# Patient Record
Sex: FEMALE | Race: WHITE | Employment: OTHER | URBAN - METROPOLITAN AREA
[De-identification: names, ages, dates, MRNs, and addresses within clinical notes are randomized per-mention and may not be internally consistent; named-entity substitution may affect disease eponyms.]

---

## 2020-12-20 ENCOUNTER — HOSPITAL ENCOUNTER (EMERGENCY)
Age: 65
Discharge: HOME | End: 2020-12-20
Payer: COMMERCIAL

## 2020-12-20 VITALS
TEMPERATURE: 98.5 F | BODY MASS INDEX: 21.51 KG/M2 | DIASTOLIC BLOOD PRESSURE: 89 MMHG | HEIGHT: 64 IN | WEIGHT: 126 LBS | RESPIRATION RATE: 20 BRPM | HEART RATE: 91 BPM | OXYGEN SATURATION: 97 % | SYSTOLIC BLOOD PRESSURE: 145 MMHG

## 2020-12-20 PROCEDURE — 2500000003 HC RX 250 WO HCPCS: Performed by: PHYSICIAN ASSISTANT

## 2020-12-20 PROCEDURE — 12001 RPR S/N/AX/GEN/TRNK 2.5CM/<: CPT

## 2020-12-20 PROCEDURE — 99283 EMERGENCY DEPT VISIT LOW MDM: CPT

## 2020-12-20 RX ORDER — LIDOCAINE HYDROCHLORIDE 10 MG/ML
5 INJECTION, SOLUTION INFILTRATION; PERINEURAL ONCE
Status: COMPLETED | OUTPATIENT
Start: 2020-12-20 | End: 2020-12-20

## 2020-12-20 RX ADMIN — LIDOCAINE HYDROCHLORIDE 5 ML: 10 INJECTION, SOLUTION INFILTRATION; PERINEURAL at 17:31

## 2020-12-20 ASSESSMENT — ENCOUNTER SYMPTOMS
SHORTNESS OF BREATH: 0
BACK PAIN: 0
ABDOMINAL PAIN: 0

## 2020-12-20 ASSESSMENT — PAIN DESCRIPTION - ORIENTATION: ORIENTATION: LEFT

## 2020-12-20 ASSESSMENT — PAIN SCALES - GENERAL
PAINLEVEL_OUTOF10: 5
PAINLEVEL_OUTOF10: 5

## 2020-12-20 ASSESSMENT — PAIN DESCRIPTION - LOCATION: LOCATION: HAND

## 2020-12-20 ASSESSMENT — PAIN DESCRIPTION - PAIN TYPE: TYPE: ACUTE PAIN

## 2020-12-20 NOTE — ED TRIAGE NOTES
Pt arrived to ED with laceration to top of left hand after cutting it on top of nail. Bleeding controlled at this time.

## 2020-12-20 NOTE — ED PROVIDER NOTES
[address] ENCOUNTER          PHYSICIAN ASSISTANT NOTE       Date of evaluation: 12/20/2020    Chief Complaint     Laceration (cut top of left hand on head of nail)      History of Present Illness     Rosetta Pérez is a 63 y.o. female who presents to the emergency department due to laceration. Patient states that just prior to arrival to the emergency department she accidentally struck the top of her left hand against a nail that was on her ground. She states they had recently remove carpet and the nail was still on the floor. She denies any fall or injury causing the laceration. Denies any current pain at this time. Denies numbness, tingling, or weakness. She has full range of motion of all digits of the hand without difficulty. She states her tetanus shot is up-to-date. Denies any other injuries at this time. Review of Systems     Review of Systems   Constitutional: Negative for activity change. HENT: Negative for congestion. Eyes: Negative for visual disturbance. Respiratory: Negative for shortness of breath. Cardiovascular: Negative for chest pain. Gastrointestinal: Negative for abdominal pain. Musculoskeletal: Negative for back pain. Skin: Positive for wound. Neurological: Negative for light-headedness. Psychiatric/Behavioral: Negative for agitation. Past Medical, Surgical, Family, and Social History     She has no past medical history on file. She has no past surgical history on file. Her family history is not on file. She reports that she has been smoking. She has been smoking about 1.00 pack per day. She has never used smokeless tobacco. She reports current alcohol use. She reports that she does not use drugs. Medications     Previous Medications    ALENDRONATE SODIUM (FOSAMAX PO)    Take by mouth       Allergies     She has No Known Allergies.     Physical Exam INITIAL VITALS: BP: (!) 145/76, Temp: 98.5 °F (36.9 °C), Pulse: 98, Resp: 16, SpO2: 98 %  Physical Exam  Constitutional:       Appearance: Normal appearance. HENT:      Head: Normocephalic and atraumatic. Eyes:      Extraocular Movements: Extraocular movements intact. Pupils: Pupils are equal, round, and reactive to light. Neck:      Musculoskeletal: Normal range of motion. Cardiovascular:      Rate and Rhythm: Normal rate and regular rhythm. Pulmonary:      Effort: Pulmonary effort is normal.   Abdominal:      General: There is no distension. Musculoskeletal: Normal range of motion. Skin:     General: Skin is warm. Comments: 2cm laceration to dorsum of hand over 5th metacarpal bone   Neurological:      General: No focal deficit present. Mental Status: She is alert and oriented to person, place, and time. Sensory: No sensory deficit. Diagnostic Results     RADIOLOGY:  No orders to display       LABS:   No results found for this visit on 12/20/20. RECENT VITALS:  BP: (!) 145/76, Temp: 98.5 °F (36.9 °C), Pulse: 98, Resp: 16, SpO2: 98 %     Procedures     Lac Repair    Date/Time: 12/20/2020 5:45 PM  Performed by: Chasity Morris PA-C  Authorized by: Chasity Morris PA-C     Consent:     Consent obtained:  Verbal    Consent given by:  Patient    Risks discussed:  Infection and poor wound healing    Alternatives discussed:  No treatment  Anesthesia (see MAR for exact dosages):      Anesthesia method:  Local infiltration    Local anesthetic:  Lidocaine 1% WITH epi  Laceration details:     Location:  Hand    Hand location:  L hand, dorsum    Length (cm):  2.5  Pre-procedure details:     Preparation:  Patient was prepped and draped in usual sterile fashion  Exploration:     Hemostasis achieved with:  Direct pressure    Wound exploration: wound explored through full range of motion and entire depth of wound probed and visualized      Contaminated: no    Treatment: Patient presents the emergency department with laceration of the dorsum of the left hand that occurred just prior to arrival.  Denies any fall or injury, stating she scraped the dorsum of her hand on the head of a nail. Denies any other injuries. On physical exam she does have a 2.5 cm laceration of the dorsum of her left hand. No bony tenderness. Full range of motion of all digits without any pain. Wound was numb and the depth of the wound was explored. No tendon involvement. Wound was thoroughly irrigated with normal saline and chlorhexidine. 4 sutures were placed. Patient states her tetanus shot is up-to-date. At this time, I believe patient is stable to be discharged. She was told to follow-up with her primary care provider or return to the emergency department for removal of sutures in 10 to 14 days. Plan was thoroughly discussed with the patient and she is agreeable. She was given strict return precautions prior to discharge. This patient was also evaluated by the attending physician. All care plans were discussed and agreed upon. Clinical Impression     1.  Laceration of left hand without foreign body, initial encounter        Disposition     PATIENT REFERRED TO:  [facility] Emergency Department  [address] [place] [address]  [phone]  Go to   If symptoms worsen      DISCHARGE MEDICATIONS:  New Prescriptions    No medications on file       DISPOSITION Decision To Discharge 12/20/2020 05:43:49 PM        Vivienne Garnica PA-C  12/20/20 [id number]